# Patient Record
Sex: MALE | Race: WHITE | NOT HISPANIC OR LATINO | URBAN - METROPOLITAN AREA
[De-identification: names, ages, dates, MRNs, and addresses within clinical notes are randomized per-mention and may not be internally consistent; named-entity substitution may affect disease eponyms.]

---

## 2017-03-09 ENCOUNTER — OUTPATIENT (OUTPATIENT)
Dept: OUTPATIENT SERVICES | Facility: HOSPITAL | Age: 62
LOS: 1 days | End: 2017-03-09
Payer: COMMERCIAL

## 2017-03-09 VITALS
SYSTOLIC BLOOD PRESSURE: 120 MMHG | HEART RATE: 66 BPM | WEIGHT: 179.9 LBS | OXYGEN SATURATION: 96 % | TEMPERATURE: 98 F | RESPIRATION RATE: 18 BRPM | HEIGHT: 69 IN | DIASTOLIC BLOOD PRESSURE: 70 MMHG

## 2017-03-09 DIAGNOSIS — I20.0 UNSTABLE ANGINA: ICD-10-CM

## 2017-03-09 DIAGNOSIS — Z98.890 OTHER SPECIFIED POSTPROCEDURAL STATES: Chronic | ICD-10-CM

## 2017-03-09 DIAGNOSIS — R07.9 CHEST PAIN, UNSPECIFIED: ICD-10-CM

## 2017-03-09 DIAGNOSIS — S76.101A UNSPECIFIED INJURY OF RIGHT QUADRICEPS MUSCLE, FASCIA AND TENDON, INITIAL ENCOUNTER: Chronic | ICD-10-CM

## 2017-03-09 LAB
ALBUMIN SERPL ELPH-MCNC: 5.1 G/DL — HIGH (ref 3.3–5)
ALP SERPL-CCNC: 64 U/L — SIGNIFICANT CHANGE UP (ref 40–120)
ALT FLD-CCNC: 42 U/L RC — SIGNIFICANT CHANGE UP (ref 10–45)
ANION GAP SERPL CALC-SCNC: 15 MMOL/L — SIGNIFICANT CHANGE UP (ref 5–17)
AST SERPL-CCNC: 36 U/L — SIGNIFICANT CHANGE UP (ref 10–40)
BILIRUB SERPL-MCNC: 0.8 MG/DL — SIGNIFICANT CHANGE UP (ref 0.2–1.2)
BUN SERPL-MCNC: 20 MG/DL — SIGNIFICANT CHANGE UP (ref 7–23)
CALCIUM SERPL-MCNC: 10.1 MG/DL — SIGNIFICANT CHANGE UP (ref 8.4–10.5)
CHLORIDE SERPL-SCNC: 99 MMOL/L — SIGNIFICANT CHANGE UP (ref 96–108)
CO2 SERPL-SCNC: 26 MMOL/L — SIGNIFICANT CHANGE UP (ref 22–31)
CREAT SERPL-MCNC: 1.2 MG/DL — SIGNIFICANT CHANGE UP (ref 0.5–1.3)
GLUCOSE SERPL-MCNC: 106 MG/DL — HIGH (ref 70–99)
HCT VFR BLD CALC: 43.9 % — SIGNIFICANT CHANGE UP (ref 39–50)
HGB BLD-MCNC: 15.2 G/DL — SIGNIFICANT CHANGE UP (ref 13–17)
MCHC RBC-ENTMCNC: 30.8 PG — SIGNIFICANT CHANGE UP (ref 27–34)
MCHC RBC-ENTMCNC: 34.7 GM/DL — SIGNIFICANT CHANGE UP (ref 32–36)
MCV RBC AUTO: 88.9 FL — SIGNIFICANT CHANGE UP (ref 80–100)
PLATELET # BLD AUTO: 204 K/UL — SIGNIFICANT CHANGE UP (ref 150–400)
POTASSIUM SERPL-MCNC: 4.9 MMOL/L — SIGNIFICANT CHANGE UP (ref 3.5–5.3)
POTASSIUM SERPL-SCNC: 4.9 MMOL/L — SIGNIFICANT CHANGE UP (ref 3.5–5.3)
PROT SERPL-MCNC: 8.2 G/DL — SIGNIFICANT CHANGE UP (ref 6–8.3)
RBC # BLD: 4.94 M/UL — SIGNIFICANT CHANGE UP (ref 4.2–5.8)
RBC # FLD: 12.3 % — SIGNIFICANT CHANGE UP (ref 10.3–14.5)
SODIUM SERPL-SCNC: 140 MMOL/L — SIGNIFICANT CHANGE UP (ref 135–145)
WBC # BLD: 6.7 K/UL — SIGNIFICANT CHANGE UP (ref 3.8–10.5)
WBC # FLD AUTO: 6.7 K/UL — SIGNIFICANT CHANGE UP (ref 3.8–10.5)

## 2017-03-09 PROCEDURE — 93005 ELECTROCARDIOGRAM TRACING: CPT

## 2017-03-09 PROCEDURE — 80053 COMPREHEN METABOLIC PANEL: CPT

## 2017-03-09 PROCEDURE — 93458 L HRT ARTERY/VENTRICLE ANGIO: CPT

## 2017-03-09 PROCEDURE — C1887: CPT

## 2017-03-09 PROCEDURE — C1769: CPT

## 2017-03-09 PROCEDURE — C1894: CPT

## 2017-03-09 PROCEDURE — 93010 ELECTROCARDIOGRAM REPORT: CPT

## 2017-03-09 PROCEDURE — 85027 COMPLETE CBC AUTOMATED: CPT

## 2017-03-09 RX ORDER — ATORVASTATIN CALCIUM 80 MG/1
1 TABLET, FILM COATED ORAL
Qty: 0 | Refills: 0 | COMMUNITY

## 2017-03-09 RX ORDER — METOPROLOL TARTRATE 50 MG
1 TABLET ORAL
Qty: 0 | Refills: 0 | COMMUNITY

## 2017-03-09 RX ORDER — SODIUM CHLORIDE 9 MG/ML
3 INJECTION INTRAMUSCULAR; INTRAVENOUS; SUBCUTANEOUS EVERY 8 HOURS
Qty: 0 | Refills: 0 | Status: DISCONTINUED | OUTPATIENT
Start: 2017-03-09 | End: 2017-03-24

## 2017-03-09 RX ORDER — ASPIRIN/CALCIUM CARB/MAGNESIUM 324 MG
1 TABLET ORAL
Qty: 0 | Refills: 0 | COMMUNITY

## 2017-03-09 NOTE — H&P CARDIOLOGY - HISTORY OF PRESENT ILLNESS
59M h/o GERD, HTN and HLP presents with facial cellulitis after patient failed outpatient PO Keflex. He soon thereafter developed swelling of his nose and was started on PO keflex on Friday. He took Keflex friday (2000mg), saturday (1500mg), sunday (1500mg) and one pill today. Patient symptoms are worsening, now with fever and cellulitis tracking up into face. He says this started on Friday after he shaved his nose hairs. 62 year old male pt with PMHX of GERD, HTN and HLD presents for cardia cath. Pt reports he has been experiencing SOTO for past few weeks, evalauted by Dr. Porter Sanz, referred for cath.  Pt reports he had stress test 2 years ago that was "fine."

## 2017-03-09 NOTE — H&P CARDIOLOGY - PMH
GERD (gastroesophageal reflux disease)    High cholesterol    Hypertension GERD (gastroesophageal reflux disease)    High cholesterol    Hypertension    Rotator cuff tear, left

## 2017-03-09 NOTE — H&P CARDIOLOGY - PSH
H/O arthroscopy  right shoulder 25 years ago  History of shoulder surgery    Injury of right quadriceps muscle, fascia, or tendon  repair 25 years ago

## 2020-08-14 ENCOUNTER — APPOINTMENT (OUTPATIENT)
Dept: ORTHOPEDIC SURGERY | Facility: CLINIC | Age: 65
End: 2020-08-14
Payer: COMMERCIAL

## 2020-08-14 VITALS
SYSTOLIC BLOOD PRESSURE: 137 MMHG | HEART RATE: 88 BPM | WEIGHT: 180 LBS | TEMPERATURE: 97.7 F | HEIGHT: 69 IN | BODY MASS INDEX: 26.66 KG/M2 | DIASTOLIC BLOOD PRESSURE: 93 MMHG

## 2020-08-14 PROCEDURE — 99203 OFFICE O/P NEW LOW 30 MIN: CPT

## 2020-08-14 PROCEDURE — 73610 X-RAY EXAM OF ANKLE: CPT | Mod: RT

## 2020-08-15 ENCOUNTER — TRANSCRIPTION ENCOUNTER (OUTPATIENT)
Age: 65
End: 2020-08-15

## 2020-08-21 ENCOUNTER — RESULT REVIEW (OUTPATIENT)
Age: 65
End: 2020-08-21

## 2020-08-21 ENCOUNTER — APPOINTMENT (OUTPATIENT)
Age: 65
End: 2020-08-21
Payer: COMMERCIAL

## 2020-08-21 ENCOUNTER — OUTPATIENT (OUTPATIENT)
Dept: OUTPATIENT SERVICES | Facility: HOSPITAL | Age: 65
LOS: 1 days | End: 2020-08-21
Payer: COMMERCIAL

## 2020-08-21 DIAGNOSIS — Z00.8 ENCOUNTER FOR OTHER GENERAL EXAMINATION: ICD-10-CM

## 2020-08-21 DIAGNOSIS — Z98.890 OTHER SPECIFIED POSTPROCEDURAL STATES: Chronic | ICD-10-CM

## 2020-08-21 DIAGNOSIS — S76.101A UNSPECIFIED INJURY OF RIGHT QUADRICEPS MUSCLE, FASCIA AND TENDON, INITIAL ENCOUNTER: Chronic | ICD-10-CM

## 2020-08-21 PROCEDURE — 73721 MRI JNT OF LWR EXTRE W/O DYE: CPT

## 2020-08-21 PROCEDURE — 73721 MRI JNT OF LWR EXTRE W/O DYE: CPT | Mod: 26,RT

## 2020-08-24 NOTE — PHYSICAL EXAM
[de-identified] : Radiographs (3v R ankle) reveal PTS R foot. [de-identified] : Extremity: +Equinus R LE, extensor substitution, +PPAV R foot, able to perform R SLHR testing, residual tenderness anterolateral aspect R ankle.  Nontender R medial malleolus, Deltoid, syndesmosis, Achilles, peroneals, distal fibula, hindfoot ST, midfoot LF and PTT insertional, and forefoot / base 5th metatarsal.  Stable Drawer testing R ankle, 5 / 5 evertor strength R ankle, calves soft and nontender, sensorimotor unchanged, skin intact B LE.  AOx3, mood / affect normal.

## 2020-08-24 NOTE — DISCUSSION/SUMMARY
[de-identified] : Discussed with patient potential nature of condition, course / sequelae, options reviewed.  NB shoe wear, activity modification, ankle rehabilitation and HEP, MRI assessment ID ankle.  Return to office in 2 - 3 weeks / PRN, advanced imaging study review.  All questions answered.

## 2020-08-24 NOTE — HISTORY OF PRESENT ILLNESS
[Other: ____] : [unfilled] [FreeTextEntry1] : Reports anterolateral pain R ankle since November 2019, underwent two steroid injections by Dr. Gambao, as well as prior history of steroid injections right forefoot by RAMÓN.  Denies antecedent trauma nor additional musculoskeletal complaints referable to foot / ankle.  Had MRI at outside facility [facility] N/A for this review.

## 2020-08-31 ENCOUNTER — APPOINTMENT (OUTPATIENT)
Dept: ORTHOPEDIC SURGERY | Facility: CLINIC | Age: 65
End: 2020-08-31
Payer: COMMERCIAL

## 2020-08-31 VITALS
TEMPERATURE: 97.9 F | DIASTOLIC BLOOD PRESSURE: 80 MMHG | BODY MASS INDEX: 26.66 KG/M2 | HEIGHT: 69 IN | HEART RATE: 88 BPM | WEIGHT: 180 LBS | SYSTOLIC BLOOD PRESSURE: 150 MMHG

## 2020-08-31 PROCEDURE — 99213 OFFICE O/P EST LOW 20 MIN: CPT

## 2020-10-12 ENCOUNTER — APPOINTMENT (OUTPATIENT)
Dept: ORTHOPEDIC SURGERY | Facility: CLINIC | Age: 65
End: 2020-10-12
Payer: COMMERCIAL

## 2020-10-12 VITALS — TEMPERATURE: 97.2 F

## 2020-10-12 PROCEDURE — 99213 OFFICE O/P EST LOW 20 MIN: CPT

## 2020-11-24 ENCOUNTER — APPOINTMENT (OUTPATIENT)
Dept: ORTHOPEDIC SURGERY | Facility: CLINIC | Age: 65
End: 2020-11-24
Payer: COMMERCIAL

## 2020-11-24 VITALS
HEART RATE: 99 BPM | DIASTOLIC BLOOD PRESSURE: 83 MMHG | SYSTOLIC BLOOD PRESSURE: 125 MMHG | WEIGHT: 180 LBS | OXYGEN SATURATION: 96 % | HEIGHT: 69 IN | BODY MASS INDEX: 26.66 KG/M2

## 2020-11-24 PROCEDURE — 99213 OFFICE O/P EST LOW 20 MIN: CPT

## 2020-11-24 RX ORDER — DICLOFENAC SODIUM 1% 10 MG/G
1 GEL TOPICAL
Qty: 1 | Refills: 1 | Status: ACTIVE | COMMUNITY
Start: 2020-11-24 | End: 1900-01-01

## 2021-01-19 ENCOUNTER — APPOINTMENT (OUTPATIENT)
Dept: ORTHOPEDIC SURGERY | Facility: CLINIC | Age: 66
End: 2021-01-19
Payer: COMMERCIAL

## 2021-01-19 VITALS — TEMPERATURE: 96.7 F

## 2021-01-19 PROCEDURE — 99213 OFFICE O/P EST LOW 20 MIN: CPT

## 2021-01-19 PROCEDURE — 99072 ADDL SUPL MATRL&STAF TM PHE: CPT

## 2021-01-28 ENCOUNTER — APPOINTMENT (OUTPATIENT)
Dept: MRI IMAGING | Facility: CLINIC | Age: 66
End: 2021-01-28
Payer: COMMERCIAL

## 2021-01-28 ENCOUNTER — RESULT REVIEW (OUTPATIENT)
Age: 66
End: 2021-01-28

## 2021-01-28 ENCOUNTER — OUTPATIENT (OUTPATIENT)
Dept: OUTPATIENT SERVICES | Facility: HOSPITAL | Age: 66
LOS: 1 days | End: 2021-01-28
Payer: COMMERCIAL

## 2021-01-28 DIAGNOSIS — Z98.890 OTHER SPECIFIED POSTPROCEDURAL STATES: Chronic | ICD-10-CM

## 2021-01-28 DIAGNOSIS — S76.101A UNSPECIFIED INJURY OF RIGHT QUADRICEPS MUSCLE, FASCIA AND TENDON, INITIAL ENCOUNTER: Chronic | ICD-10-CM

## 2021-01-28 DIAGNOSIS — M24.9 JOINT DERANGEMENT, UNSPECIFIED: ICD-10-CM

## 2021-01-28 PROCEDURE — 73721 MRI JNT OF LWR EXTRE W/O DYE: CPT | Mod: 26,RT

## 2021-01-28 PROCEDURE — 73721 MRI JNT OF LWR EXTRE W/O DYE: CPT

## 2021-02-08 ENCOUNTER — APPOINTMENT (OUTPATIENT)
Dept: ORTHOPEDIC SURGERY | Facility: CLINIC | Age: 66
End: 2021-02-08
Payer: COMMERCIAL

## 2021-02-08 VITALS — TEMPERATURE: 97.2 F

## 2021-02-08 DIAGNOSIS — M84.30XA STRESS FRACTURE, UNSPECIFIED SITE, INITIAL ENCOUNTER FOR FRACTURE: ICD-10-CM

## 2021-02-08 PROCEDURE — 99213 OFFICE O/P EST LOW 20 MIN: CPT

## 2021-02-08 PROCEDURE — 99072 ADDL SUPL MATRL&STAF TM PHE: CPT

## 2021-02-16 PROBLEM — M84.30XA STRESS REACTION OF BONE: Status: ACTIVE | Noted: 2020-08-31

## 2021-03-01 ENCOUNTER — APPOINTMENT (OUTPATIENT)
Dept: ORTHOPEDIC SURGERY | Facility: CLINIC | Age: 66
End: 2021-03-01
Payer: COMMERCIAL

## 2021-03-01 VITALS
BODY MASS INDEX: 26.66 KG/M2 | SYSTOLIC BLOOD PRESSURE: 141 MMHG | HEIGHT: 69 IN | HEART RATE: 109 BPM | DIASTOLIC BLOOD PRESSURE: 91 MMHG | WEIGHT: 180 LBS

## 2021-03-01 PROCEDURE — 20550 NJX 1 TENDON SHEATH/LIGAMENT: CPT | Mod: F8

## 2021-03-01 PROCEDURE — 99072 ADDL SUPL MATRL&STAF TM PHE: CPT

## 2021-03-01 PROCEDURE — 99215 OFFICE O/P EST HI 40 MIN: CPT | Mod: 25

## 2021-03-01 PROCEDURE — 73130 X-RAY EXAM OF HAND: CPT | Mod: RT

## 2021-03-16 ENCOUNTER — APPOINTMENT (OUTPATIENT)
Dept: ORTHOPEDIC SURGERY | Facility: CLINIC | Age: 66
End: 2021-03-16

## 2021-03-19 ENCOUNTER — APPOINTMENT (OUTPATIENT)
Dept: ORTHOPEDIC SURGERY | Facility: CLINIC | Age: 66
End: 2021-03-19
Payer: COMMERCIAL

## 2021-03-19 VITALS — BODY MASS INDEX: 26.66 KG/M2 | WEIGHT: 180 LBS | HEIGHT: 69 IN

## 2021-03-19 PROCEDURE — 99214 OFFICE O/P EST MOD 30 MIN: CPT | Mod: 25

## 2021-03-19 PROCEDURE — 73080 X-RAY EXAM OF ELBOW: CPT | Mod: RT

## 2021-03-19 PROCEDURE — 99072 ADDL SUPL MATRL&STAF TM PHE: CPT

## 2021-03-19 PROCEDURE — 20550 NJX 1 TENDON SHEATH/LIGAMENT: CPT | Mod: F7

## 2021-04-09 ENCOUNTER — APPOINTMENT (OUTPATIENT)
Dept: ORTHOPEDIC SURGERY | Facility: CLINIC | Age: 66
End: 2021-04-09
Payer: COMMERCIAL

## 2021-04-09 VITALS — WEIGHT: 180 LBS | BODY MASS INDEX: 26.66 KG/M2 | HEIGHT: 69 IN

## 2021-04-09 DIAGNOSIS — M77.11 LATERAL EPICONDYLITIS, RIGHT ELBOW: ICD-10-CM

## 2021-04-09 PROCEDURE — 99072 ADDL SUPL MATRL&STAF TM PHE: CPT

## 2021-04-09 PROCEDURE — 99214 OFFICE O/P EST MOD 30 MIN: CPT

## 2021-04-09 RX ORDER — NAPROXEN 500 MG/1
500 TABLET ORAL
Qty: 60 | Refills: 1 | Status: ACTIVE | COMMUNITY
Start: 2021-04-09 | End: 1900-01-01

## 2021-05-11 ENCOUNTER — APPOINTMENT (OUTPATIENT)
Dept: ORTHOPEDIC SURGERY | Facility: CLINIC | Age: 66
End: 2021-05-11
Payer: COMMERCIAL

## 2021-05-11 VITALS
BODY MASS INDEX: 26.66 KG/M2 | WEIGHT: 180 LBS | HEART RATE: 78 BPM | SYSTOLIC BLOOD PRESSURE: 110 MMHG | DIASTOLIC BLOOD PRESSURE: 72 MMHG | HEIGHT: 69 IN

## 2021-05-11 PROCEDURE — 99072 ADDL SUPL MATRL&STAF TM PHE: CPT

## 2021-05-11 PROCEDURE — 99213 OFFICE O/P EST LOW 20 MIN: CPT

## 2021-05-11 NOTE — DISCUSSION/SUMMARY
[de-identified] : Options reviewed, NB shoe wear, activity progression protocol, ankle / foot rehabilitation and HEP.  Return to office in 4 - 6 months / PRN, all questions answered.

## 2021-05-11 NOTE — HISTORY OF PRESENT ILLNESS
[Sneakers] : meron [FreeTextEntry1] : 66 y/o male returns for f/u R anterior ankle pain since November 2019.He is returned to gym 5 weeks ago and has been using elliptical and reports slight pain occasionally.  Pt reports doing well since last visit.Rates his pin 1/10 and is not taking pain medication.  Pt denies any numbness/tingling on R toes. Pt denies any new symptoms/injuries to R foot/ankle. Denies antecedent trauma nor additional musculoskeletal complaints referable to foot / ankle.

## 2021-05-11 NOTE — PHYSICAL EXAM
[Normal] : Oriented to person, place, and time, insight and judgement were intact and the affect was normal [de-identified] : Extremity: +Equinus R LE, +extensor substitution, +PPAV R foot, able to perform R SLHR testing, nontender anterior aspect right ankle / talus, essentially resolved soft tissue swelling anterior R ankle / hindfoot.  Nontender R medial malleolus, Deltoid, syndesmosis, Achilles, peroneals insertional, distal fibula, hindfoot ST, midfoot LF and PTT insertional, and forefoot / base 5th metatarsal.  Stable Drawer testing R ankle, 5 / 5 evertor and invertor strength R ankle, calves soft and nontender, sensorimotor unchanged, skin intact B LE.  AOx3, mood / affect normal. [de-identified] : DANG, NAD

## 2021-10-31 ENCOUNTER — NON-APPOINTMENT (OUTPATIENT)
Age: 66
End: 2021-10-31

## 2021-11-03 ENCOUNTER — APPOINTMENT (OUTPATIENT)
Dept: ORTHOPEDIC SURGERY | Facility: CLINIC | Age: 66
End: 2021-11-03
Payer: COMMERCIAL

## 2021-11-03 PROCEDURE — 99214 OFFICE O/P EST MOD 30 MIN: CPT | Mod: 25

## 2021-11-12 ENCOUNTER — APPOINTMENT (OUTPATIENT)
Dept: ORTHOPEDIC SURGERY | Facility: CLINIC | Age: 66
End: 2021-11-12
Payer: COMMERCIAL

## 2021-11-12 VITALS
HEART RATE: 74 BPM | SYSTOLIC BLOOD PRESSURE: 120 MMHG | BODY MASS INDEX: 27.4 KG/M2 | WEIGHT: 185 LBS | HEIGHT: 69 IN | DIASTOLIC BLOOD PRESSURE: 82 MMHG

## 2021-11-12 PROCEDURE — 26055 INCISE FINGER TENDON SHEATH: CPT | Mod: F7

## 2021-11-15 NOTE — PROCEDURE
[de-identified] : Consent obtained. Local anesthesia of 1% lidocaine with epinephrine and bicarbonate was administered to the right middle and ring finger.  Extremity was sterilely prepped and draped.  Timeout was performed identifying correct procedure and correct site.  Patient participated and agreed.  One and half centimeter longitudinal incision was made over the right middle and ring finger.  Sharp dissection was used for skin.  Blunt dissection used in the subcutaneous tissues.  Ragnell retractors were used to protect the radial and ulnar neurovascular bundles.  The A1 pulley was divided in his midline using a 15 blade.  Any remaining palmar pulleys as well as a small percentage of the A2 pulley, less than 10%, were released with Littler scissors.  Traction tenolysis of the flexor digitorum profundus and flexor digitorum superficialis was performed.  Any adhesions between the two tendons were carefully incised.  The patient was able to flex and extend his digits with no further triggering.  Wounds were irrigated.  Hemostasis achieved.  Skin was closed with 4-0 nylon.  Sterile dressings were applied.  Patient tolerated the procedure without any complications.

## 2021-11-23 ENCOUNTER — APPOINTMENT (OUTPATIENT)
Dept: ORTHOPEDIC SURGERY | Facility: CLINIC | Age: 66
End: 2021-11-23
Payer: COMMERCIAL

## 2021-11-23 PROCEDURE — 99213 OFFICE O/P EST LOW 20 MIN: CPT

## 2021-11-24 ENCOUNTER — APPOINTMENT (OUTPATIENT)
Dept: ORTHOPEDIC SURGERY | Facility: CLINIC | Age: 66
End: 2021-11-24
Payer: COMMERCIAL

## 2021-11-24 PROCEDURE — 99024 POSTOP FOLLOW-UP VISIT: CPT

## 2022-01-14 ENCOUNTER — APPOINTMENT (OUTPATIENT)
Dept: ORTHOPEDIC SURGERY | Facility: CLINIC | Age: 67
End: 2022-01-14

## 2022-05-10 ENCOUNTER — APPOINTMENT (OUTPATIENT)
Dept: ORTHOPEDIC SURGERY | Facility: CLINIC | Age: 67
End: 2022-05-10
Payer: MEDICARE

## 2022-05-10 ENCOUNTER — NON-APPOINTMENT (OUTPATIENT)
Age: 67
End: 2022-05-10

## 2022-05-10 VITALS — BODY MASS INDEX: 27.4 KG/M2 | WEIGHT: 185 LBS | HEIGHT: 69 IN

## 2022-05-10 PROCEDURE — 99214 OFFICE O/P EST MOD 30 MIN: CPT

## 2022-05-10 PROCEDURE — 73610 X-RAY EXAM OF ANKLE: CPT | Mod: RT

## 2022-07-14 ENCOUNTER — NON-APPOINTMENT (OUTPATIENT)
Age: 67
End: 2022-07-14

## 2022-08-02 ENCOUNTER — APPOINTMENT (OUTPATIENT)
Dept: ORTHOPEDIC SURGERY | Facility: CLINIC | Age: 67
End: 2022-08-02

## 2022-08-02 VITALS — BODY MASS INDEX: 27.4 KG/M2 | WEIGHT: 185 LBS | HEIGHT: 69 IN

## 2022-08-02 PROCEDURE — 99213 OFFICE O/P EST LOW 20 MIN: CPT

## 2022-10-17 ENCOUNTER — NON-APPOINTMENT (OUTPATIENT)
Age: 67
End: 2022-10-17

## 2022-11-02 ENCOUNTER — APPOINTMENT (OUTPATIENT)
Dept: ORTHOPEDIC SURGERY | Facility: CLINIC | Age: 67
End: 2022-11-02

## 2022-11-02 DIAGNOSIS — M65.332 TRIGGER FINGER, LEFT MIDDLE FINGER: ICD-10-CM

## 2022-11-02 PROCEDURE — 20550 NJX 1 TENDON SHEATH/LIGAMENT: CPT | Mod: F3

## 2022-11-02 PROCEDURE — 73130 X-RAY EXAM OF HAND: CPT | Mod: LT

## 2022-11-02 PROCEDURE — 99214 OFFICE O/P EST MOD 30 MIN: CPT | Mod: 25

## 2022-11-18 ENCOUNTER — APPOINTMENT (OUTPATIENT)
Dept: ORTHOPEDIC SURGERY | Facility: CLINIC | Age: 67
End: 2022-11-18

## 2022-11-18 DIAGNOSIS — M65.342 TRIGGER FINGER, LEFT RING FINGER: ICD-10-CM

## 2022-11-18 DIAGNOSIS — M65.331 TRIGGER FINGER, RIGHT MIDDLE FINGER: ICD-10-CM

## 2022-11-18 DIAGNOSIS — M65.341 TRIGGER FINGER, RIGHT RING FINGER: ICD-10-CM

## 2022-11-18 PROCEDURE — 99214 OFFICE O/P EST MOD 30 MIN: CPT | Mod: 25

## 2022-11-18 PROCEDURE — 20550 NJX 1 TENDON SHEATH/LIGAMENT: CPT | Mod: F2

## 2022-11-21 PROBLEM — M65.331 TRIGGER MIDDLE FINGER OF RIGHT HAND: Status: ACTIVE | Noted: 2021-03-01

## 2022-12-07 ENCOUNTER — OFFICE (OUTPATIENT)
Dept: URBAN - METROPOLITAN AREA CLINIC 90 | Facility: CLINIC | Age: 67
Setting detail: OPHTHALMOLOGY
End: 2022-12-07
Payer: MEDICARE

## 2022-12-07 DIAGNOSIS — D31.32: ICD-10-CM

## 2022-12-07 DIAGNOSIS — H34.8322: ICD-10-CM

## 2022-12-07 DIAGNOSIS — H35.412: ICD-10-CM

## 2022-12-07 DIAGNOSIS — H25.13: ICD-10-CM

## 2022-12-07 DIAGNOSIS — H35.373: ICD-10-CM

## 2022-12-07 PROCEDURE — 92134 CPTRZ OPH DX IMG PST SGM RTA: CPT | Performed by: OPHTHALMOLOGY

## 2022-12-07 PROCEDURE — 92014 COMPRE OPH EXAM EST PT 1/>: CPT | Performed by: OPHTHALMOLOGY

## 2022-12-07 ASSESSMENT — REFRACTION_CURRENTRX
OD_AXIS: 102
OD_SPHERE: +1.75
OS_ADD: +2.50
OD_VPRISM_DIRECTION: PROGS
OS_VPRISM_DIRECTION: PROGS
OS_AXIS: 094
OS_ADD: +2.75
OS_CYLINDER: -0.75
OS_OVR_VA: 20/
OD_ADD: +2.50
OD_OVR_VA: 20/
OD_CYLINDER: -0.75
OS_AXIS: 091
OS_OVR_VA: 20/
OS_VPRISM_DIRECTION: PROGS
OD_VPRISM_DIRECTION: PROGS
OS_SPHERE: +1.75
OD_OVR_VA: 20/
OD_AXIS: 107
OS_SPHERE: +1.75
OD_ADD: +2.75
OS_CYLINDER: -0.75
OD_SPHERE: +1.75
OD_CYLINDER: -0.75

## 2022-12-07 ASSESSMENT — REFRACTION_MANIFEST
OD_VA1: 20/20-1
OS_CYLINDER: -0.75
OD_ADD: +2.50
OS_ADD: +2.50
OD_ADD: +2.50
OD_VA1: 20/20
OS_AXIS: 090
OS_VA1: 20/20
OS_VA1: 20/30+2
OD_SPHERE: +1.75
OD_SPHERE: +2.00
OS_SPHERE: +1.75
OS_SPHERE: +2.25
OS_CYLINDER: -0.75
OD_VA2: 20/20
OD_AXIS: 100
OD_VA1: 20/20
OD_VA2: 20/20
OS_ADD: +2.50
OS_ADD: +2.50
OS_AXIS: 90
OD_SPHERE: +2.00
OD_AXIS: 105
OD_ADD: +2.50
OS_VA1: 20/25+2
OD_CYLINDER: -0.50
OS_VA2: 20/20
OS_VA2: 20/20
OS_AXIS: 090
OD_CYLINDER: -0.75
OS_CYLINDER: -0.50
OD_AXIS: 105
OS_SPHERE: +2.00
OD_CYLINDER: -0.75

## 2022-12-07 ASSESSMENT — SPHEQUIV_DERIVED
OD_SPHEQUIV: 1.375
OS_SPHEQUIV: 1.5
OS_SPHEQUIV: 2.25
OD_SPHEQUIV: 1.75
OD_SPHEQUIV: 2.625
OD_SPHEQUIV: 1.625
OS_SPHEQUIV: 1.625
OS_SPHEQUIV: 1.875

## 2022-12-07 ASSESSMENT — KERATOMETRY
OS_AXISANGLE_DEGREES: 169
OS_K1POWER_DIOPTERS: 45.00
OD_K2POWER_DIOPTERS: 45.50
OD_AXISANGLE_DEGREES: 006
METHOD_AUTO_MANUAL: AUTO
OS_K2POWER_DIOPTERS: 45.50
OD_K1POWER_DIOPTERS: 44.75

## 2022-12-07 ASSESSMENT — REFRACTION_AUTOREFRACTION
OS_SPHERE: +2.75
OS_CYLINDER: -1.00
OD_SPHERE: +3.25
OD_AXIS: 089
OD_CYLINDER: -1.25
OS_AXIS: 091

## 2022-12-07 ASSESSMENT — TONOMETRY
OD_IOP_MMHG: 13
OS_IOP_MMHG: 14

## 2022-12-07 ASSESSMENT — VISUAL ACUITY
OD_BCVA: 20/30-2
OS_BCVA: 20/25-2

## 2022-12-07 ASSESSMENT — AXIALLENGTH_DERIVED
OD_AL: 22.5112
OD_AL: 22.4229
OS_AL: 22.1644
OD_AL: 22.379
OS_AL: 22.4254
OS_AL: 22.2941
OD_AL: 22.0764
OS_AL: 22.3815

## 2022-12-07 ASSESSMENT — CONFRONTATIONAL VISUAL FIELD TEST (CVF)
OS_FINDINGS: FULL
OD_FINDINGS: FULL

## 2023-02-13 ENCOUNTER — OFFICE (OUTPATIENT)
Dept: URBAN - METROPOLITAN AREA CLINIC 90 | Facility: CLINIC | Age: 68
Setting detail: OPHTHALMOLOGY
End: 2023-02-13
Payer: MEDICARE

## 2023-02-13 DIAGNOSIS — H57.12: ICD-10-CM

## 2023-02-13 DIAGNOSIS — E78.01: ICD-10-CM

## 2023-02-13 PROCEDURE — 92012 INTRM OPH EXAM EST PATIENT: CPT | Performed by: OPHTHALMOLOGY

## 2023-02-13 ASSESSMENT — REFRACTION_MANIFEST
OD_AXIS: 100
OD_VA2: 20/20
OD_ADD: +2.50
OD_VA2: 20/20
OS_VA1: 20/20
OS_CYLINDER: -0.50
OD_SPHERE: +2.00
OS_SPHERE: +1.75
OD_AXIS: 105
OD_CYLINDER: -0.75
OS_VA1: 20/25+2
OS_VA2: 20/20
OD_ADD: +2.50
OD_SPHERE: +2.00
OD_ADD: +2.50
OS_VA1: 20/30+2
OD_VA1: 20/20-1
OD_CYLINDER: -0.50
OS_AXIS: 090
OS_AXIS: 090
OS_ADD: +2.50
OS_ADD: +2.50
OS_CYLINDER: -0.75
OD_AXIS: 105
OD_CYLINDER: -0.75
OS_CYLINDER: -0.75
OD_VA1: 20/20
OS_AXIS: 90
OD_VA1: 20/20
OS_SPHERE: +2.25
OD_SPHERE: +1.75
OS_SPHERE: +2.00
OS_ADD: +2.50
OS_VA2: 20/20

## 2023-02-13 ASSESSMENT — REFRACTION_CURRENTRX
OS_SPHERE: +1.75
OS_ADD: +2.50
OD_VPRISM_DIRECTION: PROGS
OS_AXIS: 091
OS_SPHERE: +1.75
OD_CYLINDER: +0.75
OS_ADD: +2.75
OS_OVR_VA: 20/
OS_VPRISM_DIRECTION: PROGS
OS_AXIS: 094
OD_CYLINDER: -0.75
OS_VPRISM_DIRECTION: PROGS
OD_ADD: +2.50
OD_SPHERE: +1.75
OD_SPHERE: +1.75
OS_CYLINDER: -0.75
OS_SPHERE: +1.75
OS_VPRISM_DIRECTION: PROGS
OD_ADD: +2.75
OD_VPRISM_DIRECTION: PROGS
OD_CYLINDER: -0.75
OS_OVR_VA: 20/
OS_CYLINDER: -0.75
OD_AXIS: 097
OS_ADD: +2.50
OD_AXIS: 102
OS_AXIS: 091
OD_OVR_VA: 20/
OD_SPHERE: +1.75
OD_OVR_VA: 20/
OS_OVR_VA: 20/
OD_OVR_VA: 20/
OS_CYLINDER: -0.75
OD_ADD: +2.50
OD_AXIS: 107
OD_VPRISM_DIRECTION: PROGS

## 2023-02-13 ASSESSMENT — SPHEQUIV_DERIVED
OD_SPHEQUIV: 1.75
OD_SPHEQUIV: 2.375
OS_SPHEQUIV: 1.875
OD_SPHEQUIV: 1.375
OS_SPHEQUIV: 1.625
OD_SPHEQUIV: 1.625
OS_SPHEQUIV: 1.5
OS_SPHEQUIV: 2.25

## 2023-02-13 ASSESSMENT — AXIALLENGTH_DERIVED
OD_AL: 22.3041
OS_AL: 22.2607
OS_AL: 22.0035
OD_AL: 22.1743
OD_AL: 21.9612
OS_AL: 22.2174
OD_AL: 22.2174
OS_AL: 22.1313

## 2023-02-13 ASSESSMENT — KERATOMETRY
OD_K1POWER_DIOPTERS: 45.25
OS_K2POWER_DIOPTERS: 45.75
METHOD_AUTO_MANUAL: AUTO
OS_AXISANGLE_DEGREES: 090
OS_K1POWER_DIOPTERS: 45.75
OD_AXISANGLE_DEGREES: 007
OD_K2POWER_DIOPTERS: 46.25

## 2023-02-13 ASSESSMENT — VISUAL ACUITY
OS_BCVA: 20/25-2
OD_BCVA: 20/30+2

## 2023-02-13 ASSESSMENT — REFRACTION_AUTOREFRACTION
OD_SPHERE: +3.00
OD_CYLINDER: -1.25
OD_AXIS: 087
OS_CYLINDER: -1.00
OS_SPHERE: +2.75
OS_AXIS: 096

## 2023-02-13 ASSESSMENT — TONOMETRY
OS_IOP_MMHG: 18
OD_IOP_MMHG: 18

## 2023-02-13 ASSESSMENT — CONFRONTATIONAL VISUAL FIELD TEST (CVF)
OS_FINDINGS: FULL
OD_FINDINGS: FULL

## 2023-12-04 ENCOUNTER — OFFICE (OUTPATIENT)
Dept: URBAN - METROPOLITAN AREA CLINIC 90 | Facility: CLINIC | Age: 68
Setting detail: OPHTHALMOLOGY
End: 2023-12-04
Payer: MEDICARE

## 2023-12-04 DIAGNOSIS — H35.373: ICD-10-CM

## 2023-12-04 DIAGNOSIS — H25.13: ICD-10-CM

## 2023-12-04 DIAGNOSIS — H57.12: ICD-10-CM

## 2023-12-04 DIAGNOSIS — H35.412: ICD-10-CM

## 2023-12-04 DIAGNOSIS — D31.32: ICD-10-CM

## 2023-12-04 DIAGNOSIS — H34.8322: ICD-10-CM

## 2023-12-04 DIAGNOSIS — E78.01: ICD-10-CM

## 2023-12-04 PROCEDURE — 92014 COMPRE OPH EXAM EST PT 1/>: CPT | Performed by: OPHTHALMOLOGY

## 2023-12-04 PROCEDURE — 92250 FUNDUS PHOTOGRAPHY W/I&R: CPT | Performed by: OPHTHALMOLOGY

## 2023-12-04 ASSESSMENT — REFRACTION_CURRENTRX
OD_AXIS: 104
OD_CYLINDER: +0.75
OD_AXIS: 097
OS_SPHERE: +1.75
OD_OVR_VA: 20/
OS_CYLINDER: -0.75
OS_CYLINDER: -0.75
OS_OVR_VA: 20/
OS_VPRISM_DIRECTION: PROGS
OS_ADD: +2.75
OS_SPHERE: +1.75
OD_ADD: +2.75
OS_ADD: +2.50
OS_AXIS: 094
OD_VPRISM_DIRECTION: PROGS
OD_VPRISM_DIRECTION: PROGS
OD_CYLINDER: -0.75
OS_OVR_VA: 20/
OS_SPHERE: +1.75
OS_CYLINDER: -0.75
OS_AXIS: 091
OD_SPHERE: +1.75
OD_AXIS: 102
OD_SPHERE: +1.75
OD_OVR_VA: 20/
OD_AXIS: 107
OD_SPHERE: +1.75
OD_VPRISM_DIRECTION: PROGS
OD_ADD: +2.50
OS_AXIS: 091
OS_CYLINDER: -0.75
OD_VPRISM_DIRECTION: PROGS
OS_SPHERE: +1.75
OD_CYLINDER: -0.75
OS_VPRISM_DIRECTION: PROGS
OD_ADD: +2.50
OD_CYLINDER: -0.75
OS_ADD: +2.50
OS_AXIS: 092
OS_ADD: +2.00
OD_OVR_VA: 20/
OD_SPHERE: +1.75
OS_OVR_VA: 20/
OD_ADD: +2.00

## 2023-12-04 ASSESSMENT — REFRACTION_MANIFEST
OD_VA1: 20/20-1
OD_CYLINDER: -0.75
OS_AXIS: 090
OD_ADD: +2.50
OS_VA2: 20/20
OS_AXIS: 90
OS_ADD: +2.50
OS_SPHERE: +2.25
OS_VA2: 20/20
OS_CYLINDER: -0.75
OD_ADD: +2.50
OS_CYLINDER: -0.75
OS_VA1: 20/20
OD_VA1: 20/20
OS_VA1: 20/25+2
OS_SPHERE: +2.00
OD_VA2: 20/20
OS_ADD: +2.50
OD_ADD: +2.50
OS_VA1: 20/30+2
OD_CYLINDER: -0.50
OD_AXIS: 105
OD_CYLINDER: -0.75
OD_SPHERE: +1.75
OD_AXIS: 100
OS_AXIS: 090
OD_SPHERE: +2.00
OS_CYLINDER: -0.50
OS_SPHERE: +1.75
OD_VA1: 20/20
OD_SPHERE: +2.00
OD_VA2: 20/20
OD_AXIS: 105
OS_ADD: +2.50

## 2023-12-04 ASSESSMENT — SPHEQUIV_DERIVED
OD_SPHEQUIV: 3
OD_SPHEQUIV: 1.75
OD_SPHEQUIV: 1.625
OS_SPHEQUIV: 1.625
OD_SPHEQUIV: 1.375
OS_SPHEQUIV: 2.875
OS_SPHEQUIV: 1.875
OS_SPHEQUIV: 1.5

## 2023-12-04 ASSESSMENT — REFRACTION_AUTOREFRACTION
OS_CYLINDER: -1.25
OS_AXIS: 090
OD_SPHERE: +3.75
OD_AXIS: 090
OS_SPHERE: +3.50
OD_CYLINDER: -1.50

## 2023-12-04 ASSESSMENT — CONFRONTATIONAL VISUAL FIELD TEST (CVF)
OS_FINDINGS: FULL
OD_FINDINGS: FULL

## 2024-01-08 ENCOUNTER — NON-APPOINTMENT (OUTPATIENT)
Age: 69
End: 2024-01-08

## 2024-01-12 ENCOUNTER — APPOINTMENT (OUTPATIENT)
Dept: ORTHOPEDIC SURGERY | Facility: CLINIC | Age: 69
End: 2024-01-12
Payer: MEDICARE

## 2024-01-12 VITALS
HEIGHT: 69 IN | HEART RATE: 97 BPM | WEIGHT: 180 LBS | DIASTOLIC BLOOD PRESSURE: 82 MMHG | SYSTOLIC BLOOD PRESSURE: 128 MMHG | BODY MASS INDEX: 26.66 KG/M2

## 2024-01-12 PROCEDURE — 99214 OFFICE O/P EST MOD 30 MIN: CPT

## 2024-01-12 PROCEDURE — 72170 X-RAY EXAM OF PELVIS: CPT

## 2024-01-12 RX ORDER — MELOXICAM 15 MG/1
15 TABLET ORAL DAILY
Qty: 1 | Refills: 0 | Status: ACTIVE | COMMUNITY
Start: 2024-01-12 | End: 1900-01-01

## 2024-02-09 ENCOUNTER — APPOINTMENT (OUTPATIENT)
Dept: ORTHOPEDIC SURGERY | Facility: CLINIC | Age: 69
End: 2024-02-09
Payer: MEDICARE

## 2024-02-09 VITALS — WEIGHT: 183 LBS | BODY MASS INDEX: 27.11 KG/M2 | HEIGHT: 69 IN

## 2024-02-09 PROCEDURE — 20610 DRAIN/INJ JOINT/BURSA W/O US: CPT | Mod: RT

## 2024-02-09 PROCEDURE — 99214 OFFICE O/P EST MOD 30 MIN: CPT | Mod: 25

## 2024-02-22 ENCOUNTER — APPOINTMENT (OUTPATIENT)
Dept: ORTHOPEDIC SURGERY | Facility: CLINIC | Age: 69
End: 2024-02-22
Payer: MEDICARE

## 2024-02-22 VITALS — WEIGHT: 180 LBS | BODY MASS INDEX: 26.66 KG/M2 | HEIGHT: 69 IN

## 2024-02-22 PROCEDURE — 99214 OFFICE O/P EST MOD 30 MIN: CPT

## 2024-02-22 RX ORDER — DIAZEPAM 2 MG/1
2 TABLET ORAL ONCE
Qty: 2 | Refills: 0 | Status: ACTIVE | COMMUNITY
Start: 2024-02-22 | End: 1900-01-01

## 2024-02-22 RX ORDER — DICLOFENAC SODIUM 75 MG/1
75 TABLET, DELAYED RELEASE ORAL
Qty: 28 | Refills: 0 | Status: ACTIVE | COMMUNITY
Start: 2024-02-22 | End: 1900-01-01

## 2024-02-23 ENCOUNTER — APPOINTMENT (OUTPATIENT)
Dept: ORTHOPEDIC SURGERY | Facility: CLINIC | Age: 69
End: 2024-02-23

## 2024-02-26 ENCOUNTER — APPOINTMENT (OUTPATIENT)
Dept: ORTHOPEDIC SURGERY | Facility: CLINIC | Age: 69
End: 2024-02-26
Payer: MEDICARE

## 2024-02-26 VITALS
TEMPERATURE: 97.2 F | DIASTOLIC BLOOD PRESSURE: 93 MMHG | OXYGEN SATURATION: 98 % | WEIGHT: 185 LBS | HEIGHT: 69 IN | RESPIRATION RATE: 17 BRPM | BODY MASS INDEX: 27.4 KG/M2 | SYSTOLIC BLOOD PRESSURE: 166 MMHG | HEART RATE: 79 BPM

## 2024-02-26 DIAGNOSIS — M25.571 PAIN IN RIGHT ANKLE AND JOINTS OF RIGHT FOOT: ICD-10-CM

## 2024-02-26 DIAGNOSIS — R93.7 ABNORMAL FINDINGS ON DIAGNOSTIC IMAGING OF OTHER PARTS OF MUSCULOSKELETAL SYSTEM: ICD-10-CM

## 2024-02-26 DIAGNOSIS — M62.89 OTHER SPECIFIED DISORDERS OF MUSCLE: ICD-10-CM

## 2024-02-26 PROCEDURE — 99214 OFFICE O/P EST MOD 30 MIN: CPT

## 2024-02-26 PROCEDURE — 73610 X-RAY EXAM OF ANKLE: CPT | Mod: RT

## 2024-02-26 NOTE — HISTORY OF PRESENT ILLNESS
[Sneakers] : meron [FreeTextEntry1] : 70 y/o male returns for f/u R ankle pain since November 2019, R ankle pain restarted x 3 weeks. He denies any specific trauma or injury. Pt reports he was doing well, however he states he recently experienced R hip bursitis and believes his R ankle overcompensated. He states he has been placing a lot of pressure on his R ankle. He localizes the pain to anterior, lateral and medial aspects of R ankle. Walking worsens the pain. Rest helps a little. Pt denies any numbness/tingling/limping. Denies additional musculoskeletal complaints referable to foot / ankle. He is currently using a walker to ambulate.

## 2024-02-26 NOTE — PHYSICAL EXAM
[de-identified] : Extremity: +equinus (releases) R LE, extensor substitution with residual tenderness dorsal extensors R LE, +PPAV R foot.  Nontender R distal fibula, peroneals, syndesmosis, Achilles, medial malleolus, hindfoot ST, midfoot LF and PTT insertional, and forefoot.  Calves soft and nontender, sensorimotor unchanged, skin intact R LE.  AOx3, mood / affect normal. [de-identified] : Radiographs (3v R ankle) reveal PTS R foot.

## 2024-02-26 NOTE — DISCUSSION/SUMMARY
[de-identified] : Options reviewed, NB shoe wear, activity modification (low impact), calf stretching exercises and HEP, Voltaren gel, MRI assessment ID ankle (by request).  Return to office in 3 - 4 weeks / PRN, advanced imaging study review TC.  All questions answreed.

## 2024-02-29 ENCOUNTER — APPOINTMENT (OUTPATIENT)
Dept: MRI IMAGING | Facility: CLINIC | Age: 69
End: 2024-02-29
Payer: MEDICARE

## 2024-02-29 ENCOUNTER — OUTPATIENT (OUTPATIENT)
Dept: OUTPATIENT SERVICES | Facility: HOSPITAL | Age: 69
LOS: 1 days | End: 2024-02-29
Payer: MEDICARE

## 2024-02-29 DIAGNOSIS — Z98.890 OTHER SPECIFIED POSTPROCEDURAL STATES: Chronic | ICD-10-CM

## 2024-02-29 DIAGNOSIS — M24.171 OTHER ARTICULAR CARTILAGE DISORDERS, RIGHT ANKLE: ICD-10-CM

## 2024-02-29 DIAGNOSIS — S76.101A UNSPECIFIED INJURY OF RIGHT QUADRICEPS MUSCLE, FASCIA AND TENDON, INITIAL ENCOUNTER: Chronic | ICD-10-CM

## 2024-02-29 DIAGNOSIS — M25.571 PAIN IN RIGHT ANKLE AND JOINTS OF RIGHT FOOT: ICD-10-CM

## 2024-02-29 PROCEDURE — 73721 MRI JNT OF LWR EXTRE W/O DYE: CPT

## 2024-02-29 PROCEDURE — 73721 MRI JNT OF LWR EXTRE W/O DYE: CPT | Mod: 26,RT,MH

## 2024-03-05 ENCOUNTER — APPOINTMENT (OUTPATIENT)
Dept: ORTHOPEDIC SURGERY | Facility: CLINIC | Age: 69
End: 2024-03-05
Payer: MEDICARE

## 2024-03-05 VITALS — WEIGHT: 185 LBS | HEIGHT: 69 IN | BODY MASS INDEX: 27.4 KG/M2

## 2024-03-05 DIAGNOSIS — S89.80XA OTHER SPECIFIED INJURIES OF UNSPECIFIED LOWER LEG, INITIAL ENCOUNTER: ICD-10-CM

## 2024-03-05 DIAGNOSIS — M77.8 OTHER ENTHESOPATHIES, NOT ELSEWHERE CLASSIFIED: ICD-10-CM

## 2024-03-05 DIAGNOSIS — M67.88 OTHER SPECIFIED DISORDERS OF SYNOVIUM AND TENDON, OTHER SITE: ICD-10-CM

## 2024-03-05 PROCEDURE — 99213 OFFICE O/P EST LOW 20 MIN: CPT

## 2024-03-11 ENCOUNTER — APPOINTMENT (OUTPATIENT)
Dept: ORTHOPEDIC SURGERY | Facility: CLINIC | Age: 69
End: 2024-03-11

## 2024-03-14 ENCOUNTER — APPOINTMENT (OUTPATIENT)
Dept: ORTHOPEDIC SURGERY | Facility: CLINIC | Age: 69
End: 2024-03-14
Payer: MEDICARE

## 2024-03-14 PROCEDURE — 99213 OFFICE O/P EST LOW 20 MIN: CPT

## 2024-03-15 ENCOUNTER — APPOINTMENT (OUTPATIENT)
Dept: ORTHOPEDIC SURGERY | Facility: CLINIC | Age: 69
End: 2024-03-15
Payer: MEDICARE

## 2024-03-15 VITALS — HEIGHT: 69 IN | BODY MASS INDEX: 27.11 KG/M2 | WEIGHT: 183 LBS

## 2024-03-15 PROCEDURE — 99203 OFFICE O/P NEW LOW 30 MIN: CPT

## 2024-03-20 NOTE — PHYSICAL EXAM
[de-identified] : Constitutional: Well-nourished, well-developed, No acute distress Respiratory:  Good respiratory effort, no SOB Lymphatic: No regional lymphadenopathy, no lymphedema Psychiatric: Pleasant and normal affect, alert and oriented x3 Musculoskeletal: normal except where as noted in regional exam  Lumbar Spine Exam  APPEARANCE: no marked deformities or malalignment, normal curvature of the lumbosacral spine. good posture POSITIVE TENDERNESS: + Right lumbar tenderness and spasm noted in erector spinae and quadratus lumborum NONTENDER: no bony midline tenderness, no marked tenderness in left lumbar musculature. ROM: full, although painful at end range of flexion and extension RESISTIVE TESTING: mildly painful 4/5 resisted flex/ext, sidebending b/l, and rotation SPECIAL TESTS: neg SLR b/l, neg DELVIS b/l, neg Trendelenburg b/l  PULSES: 2+ DP/PT pulses NEURO:  L1 - S2 intact to sensation and motor, DTRs 2+/4 patella and achilles  Right hip: APPEARANCE: no marked deformities, no swelling or malalignment POSITIVE TENDERNESS: + Piriformis at midsubstance and at its insertion on the greater trochanter NONTENDER: greater trochanter, trochanteric bursa, TFL, gluteal region, ischium/proximal hamstring region, hip flexor region, sartorius and pubic symphysis.  ROM: full & painless.  RESISTIVE TESTING: Mild pain with resisted hip ER, particularly at 90 of hip flexion, painless resisted IR/SLR/abduction/adduction.  SPECIAL TESTS: DELVIS reproduces mild pain and tightness in the buttock region, neg FADIR, painless loaded flexion & scouring

## 2024-03-20 NOTE — HISTORY OF PRESENT ILLNESS
[de-identified] : Patient has been having chronic low back, buttock, hip and leg pain.  He was seen by one of my hip associates, there was an MRI of the hip which was ordered and reviewed, there were no significant abnormal findings on that study.  Patient has some mild osteoarthritis, degenerative labral pathology, and some gluteal tendinopathy, he was advised he had no surgical indications regarding his hip.  He was referred today for further evaluation and management.  Patient continues to have chronic pain, denies any numbness/tingling.

## 2024-03-20 NOTE — DISCUSSION/SUMMARY
[de-identified] : Patient was seen today for evaluation management of chronic intermittent right-sided low back, buttock, hip and thigh pain.  He has very diffuse pain at this time, no specific clear etiology.  Patient already had MRI of the right hip which was reviewed by his hip specialist, he was advised he had no surgical indications.  Patient has some very mild gluteal tendinopathy, no significant tears seen.  Patient has mild osteoarthritis of the hip with associated degenerative labral pathology.  He is not having much pain in the groin area, it is unlikely that his hip is the primary etiology of his pain.  Based on history and clinical exam patient has no clinical indications for PRP consideration in any areas of his hip.  Patient is advised that the likely primary etiology of his pain is coming from his low back and/or possibly secondary pain from piriformis syndrome/sciatica.  Patient will be started on a course of physical therapy to restore normal range of motion and strength as tolerated.  Also recommend consultation with physiatry for further evaluation and management.  Patient appreciates and agrees with current plan.  This note was generated using dragon medical dictation software.  A reasonable effort has been made for proofreading its contents, but typos may still remain.  If there are any questions or points of clarification needed please notify my office.

## 2024-03-21 ENCOUNTER — APPOINTMENT (OUTPATIENT)
Dept: ORTHOPEDIC SURGERY | Facility: CLINIC | Age: 69
End: 2024-03-21

## 2024-03-21 ENCOUNTER — APPOINTMENT (OUTPATIENT)
Dept: PHYSICAL MEDICINE AND REHAB | Facility: CLINIC | Age: 69
End: 2024-03-21
Payer: MEDICARE

## 2024-03-21 DIAGNOSIS — M47.16 OTHER SPONDYLOSIS WITH MYELOPATHY, LUMBAR REGION: ICD-10-CM

## 2024-03-21 PROCEDURE — 99204 OFFICE O/P NEW MOD 45 MIN: CPT

## 2024-03-21 RX ORDER — METHYLPREDNISOLONE 4 MG/1
4 TABLET ORAL
Qty: 1 | Refills: 0 | Status: ACTIVE | COMMUNITY
Start: 2024-03-21 | End: 1900-01-01

## 2024-03-21 RX ORDER — TIZANIDINE 2 MG/1
2 TABLET ORAL
Qty: 45 | Refills: 0 | Status: ACTIVE | COMMUNITY
Start: 2024-03-21 | End: 1900-01-01

## 2024-03-21 NOTE — PHYSICAL EXAM
[FreeTextEntry1] : General exam   Constitutional: The patient appears well-developed, well-nourished, and in no apparent distress. Patient is well-groomed.    Skin: The skin is warm and dry, with normal turgor.  Eyes: PERRL.    ENMT: Ears: Hearing is grossly within normal limits.    Neck: Supple: The neck is supple.    Respiratory: Inspection: Breathing unlabored.    Neurologic: Alert and oriented x 3.   Psychiatric: Patient is cooperative and appropriate.  Mood and affect are normal.  Patient's insight is good, and memory and judgment are intact.  LUMBAR EXAM  APPEARANCE: No visible scars No gross deformity or malalignment No erythema, swelling or ecchymosis Normal lumbar lordosis +muscle atrophy of the left lower extremity +muscle atrophy of the right lower extremity No clubbing, cyanosis, swelling or erythema on the left lower extremity No clubbing, cyanosis, swelling or erythema on the right lower extremity  TENDERNESS: No trigger points over bilateral lumbar paraspinal muscles Absent over midline spinous processes Absent over left lumbar facet joints Absent over right lumbar facet joints  Absent over left lumbar paraspinal muscles: erector spinae and quadratus lumborum +over right lumbar paraspinal muscles: erector spinae and quadratus lumborum Right gluteal tenderness Absent over left sacroiliac joint Absent over right sacroiliac joint  ROM: Normal A/PROM of the lumbar spine No pain with flexion, extension of the lumbar spine No pain with left side bending +pain with right side bending No pain with left rotation No pain with right rotation No hamstring tightness on the left +hamstring tightness on the right  SPECIAL TESTS: Normal left straight leg raising test Normal right straight leg raising test FABERE left normal FABERE right normal FADIR left normal FADIR right normal  SENSORY TESTING: Intact to light touch Left L1-S2 Intact to light touch Right L1-S2  MOTOR TESTING: Muscle tone of the left lower extremity is normal Muscle tone of the right lower extremity is normal  Left hip flexion strength is 5/5 Right hip flexion strength is 5/5  Left quadriceps strength is 5/5 Right quadriceps strength is 5/5  Left ankle dorsiflexion strength is 5/5 Right ankle dorsiflexion strength is 5/5  Left ankle plantar flexion strength is 5/5 Right ankle plantar flexion strength is 5/5  REFLEXES: Patella (L4) left 2+ Patella (L4) right 2+  GAIT: Non-antalgic gait Able to walk on toes Able to walk on heels Balance normal with ambulation

## 2024-03-21 NOTE — HISTORY OF PRESENT ILLNESS
[FreeTextEntry1] : JENNIFER CALDERON is an 69 year old M here with his brother for initial evaluation of RIGHT lower back pains. Mr. CALDERON was sent for consultation by Dr. Campos for possible injection.   From Dr. Campos's note on 3/20/24: "Patient was seen today for evaluation management of chronic intermittent right-sided low back, buttock, hip and thigh pain. He has very diffuse pain at this time, no specific clear etiology. Patient already had MRI of the right hip which was reviewed by his hip specialist, he was advised he had no surgical indications....recommend consultation with physiatry for further evaluation and management."  Pain location: right lower back Quality: sharp Radiation: denies Severity: 10/10 on NRS Onset: january 2024 Associated symptoms: denies Numbness: denies Weakness: denies Exacerbated by: touch, movement, standing Improved by: rest Bowel or bladder involvement: denies  Denies bowel/bladder dysfunction, saddle anesthesia, fevers, chills, weight loss, night pain, or night sweats at this time.  The pain interferes with function, ADLs and quality of life. Patient had tried Acetaminophen, NSAIDs, prescription pain medications, muscle relaxants without any lasting relief of pain. PT made his pain worse  Patient had imaging studies to evaluate the pain in the HIP

## 2024-03-21 NOTE — ASSESSMENT
[FreeTextEntry1] : Mr. JENNIFER CALDERON is a 69 year M with pain in the lower back on the LEFT due to lumbar spondylosis without myelopathy vs strain vs gluteal tear. There are no myelopathic signs on today's exam.  Patient reassured and educated on the diagnosis and treatment options. Risks and benefits of treatment and of delaying treatment discussed with patient. Risks discussed include but not limited to: progression of symptoms, worsening pain and functional status, etc.  This note was generated using Dragon medical dictation software. A reasonable effort had been made for proofreading its contents, but spelling mistakes or grammatical errors may still remain. If there are any questions or points of clarification needed please notify my office.  Dr. Campos's note reviewed Dr. Zamora's note reviewed  MRI right hip reviewed: joint arthrosis; LEFT femural osteochondroma Advised patient to follow up with ortho about this finding and consider future repeat imaging  XR L sine to eval for DJD XR pelvis to eval for DJD  Start Medrol 4mg dose pack #21 tablets. Patient directed to follow directions on the blister pack and to complete the entire treatment course. Advised not to take any NSAIDs during of treatment. Denies CKD, CAD, or gastritis. Recommend that if patient develops GI symptoms including abdominal pain, nausea, or vomiting to discontinue use of medication immediately.  Start Tizanidine 2mg 1-2 tablets PO qHS PRN pain, dispense #30. Rx sent. Patient denies any liver problems. Monitor LFTs. Patient warned about the sedative nature of this medication and recommended not to drive or to handle heavy machinery.  Follow up 1 week

## 2024-03-22 ENCOUNTER — RESULT REVIEW (OUTPATIENT)
Age: 69
End: 2024-03-22

## 2024-03-22 ENCOUNTER — APPOINTMENT (OUTPATIENT)
Dept: RADIOLOGY | Facility: CLINIC | Age: 69
End: 2024-03-22
Payer: MEDICARE

## 2024-03-22 ENCOUNTER — OUTPATIENT (OUTPATIENT)
Dept: OUTPATIENT SERVICES | Facility: HOSPITAL | Age: 69
LOS: 1 days | End: 2024-03-22
Payer: MEDICARE

## 2024-03-22 DIAGNOSIS — S76.101A UNSPECIFIED INJURY OF RIGHT QUADRICEPS MUSCLE, FASCIA AND TENDON, INITIAL ENCOUNTER: Chronic | ICD-10-CM

## 2024-03-22 DIAGNOSIS — Z98.890 OTHER SPECIFIED POSTPROCEDURAL STATES: Chronic | ICD-10-CM

## 2024-03-22 DIAGNOSIS — M47.817 SPONDYLOSIS WITHOUT MYELOPATHY OR RADICULOPATHY, LUMBOSACRAL REGION: ICD-10-CM

## 2024-03-22 PROCEDURE — 72190 X-RAY EXAM OF PELVIS: CPT | Mod: 26

## 2024-03-22 PROCEDURE — 72190 X-RAY EXAM OF PELVIS: CPT

## 2024-03-22 PROCEDURE — 72100 X-RAY EXAM L-S SPINE 2/3 VWS: CPT | Mod: 26

## 2024-03-22 PROCEDURE — 72100 X-RAY EXAM L-S SPINE 2/3 VWS: CPT

## 2024-03-26 ENCOUNTER — APPOINTMENT (OUTPATIENT)
Dept: ORTHOPEDIC SURGERY | Facility: CLINIC | Age: 69
End: 2024-03-26

## 2024-03-28 ENCOUNTER — APPOINTMENT (OUTPATIENT)
Dept: PHYSICAL MEDICINE AND REHAB | Facility: CLINIC | Age: 69
End: 2024-03-28
Payer: MEDICARE

## 2024-03-28 DIAGNOSIS — M47.817 SPONDYLOSIS W/OUT MYELOPATHY OR RADICULOPATHY, LUMBOSACRAL REGION: ICD-10-CM

## 2024-03-28 PROCEDURE — 99213 OFFICE O/P EST LOW 20 MIN: CPT

## 2024-03-28 NOTE — HISTORY OF PRESENT ILLNESS
[FreeTextEntry1] : JENNIFER CALDERON is here for follow up with his brother. Since last visit he had XR L spine and pelvis. He completed medrol dosepack with temporary relief. Pain is still over 5/10 on NRS and worse with laying down and sitting.   Denies any new pain, numbness or weakness, bowel/bladder dysfunction, saddle anesthesia, fevers, chills, weight loss, night pain, or night sweats at this time.

## 2024-03-28 NOTE — ASSESSMENT
[FreeTextEntry1] : Mr. JENNIFER CALDERON is a 69 year M with pain in the lower back on the RIGHT due to lumbar spondylosis without myelopathy vs strain vs gluteal tear. There are no myelopathic signs on today's exam.  Patient reassured and educated on the diagnosis and treatment options. Risks and benefits of treatment and of delaying treatment discussed with patient. Risks discussed include but not limited to: progression of symptoms, worsening pain and functional status, etc.  This note was generated using Dragon medical dictation software. A reasonable effort had been made for proofreading its contents, but spelling mistakes or grammatical errors may still remain. If there are any questions or points of clarification needed please notify my office.  Dr. Campos's note reviewed Dr. Zamora's note reviewed  MRI right hip reviewed: joint arthrosis; LEFT femural osteochondroma Advised patient to follow up with ortho about this finding and consider future repeat imaging  XR L sine imaging reviewed with patient in office today. Imaging and report demonstrate: DJD XR pelvis imaging reviewed with patient in office today. Imaging and report demonstrate: DJD  Schedule for LEFT PSIS tendon region ULT guided injection in 1 week. Risks and benefits of having injection discussed with patient. Risks discussed included, but not limited to: pain, infection, bleeding requiring emergency surgery, headaches, damage to vital organs, etc.  If no relief will order MRI L spine and pelvis with SEDATION

## 2024-03-28 NOTE — PHYSICAL EXAM
[FreeTextEntry1] : General exam   Constitutional: The patient appears well-developed, well-nourished, and in no apparent distress. Patient is well-groomed.    Skin: The skin is warm and dry, with normal turgor.  Eyes: PERRL.    ENMT: Ears: Hearing is grossly within normal limits.    Neck: Supple: The neck is supple.    Respiratory: Inspection: Breathing unlabored.    Neurologic: Alert and oriented x 3.   Psychiatric: Patient is cooperative and appropriate.  Mood and affect are normal.  Patient's insight is good, and memory and judgment are intact.  Lumbar RIGHT PSIS region tenderness and trigger points Skin c/d/i without any erythema, swelling, effusion

## 2024-04-02 ENCOUNTER — APPOINTMENT (OUTPATIENT)
Dept: ORTHOPEDIC SURGERY | Facility: CLINIC | Age: 69
End: 2024-04-02
Payer: MEDICARE

## 2024-04-02 VITALS — BODY MASS INDEX: 27.11 KG/M2 | HEIGHT: 69 IN | WEIGHT: 183 LBS

## 2024-04-02 DIAGNOSIS — M24.171 OTHER ARTICULAR CARTILAGE DISORDERS, RIGHT ANKLE: ICD-10-CM

## 2024-04-02 DIAGNOSIS — M21.41 FLAT FOOT [PES PLANUS] (ACQUIRED), RIGHT FOOT: ICD-10-CM

## 2024-04-02 PROCEDURE — 99213 OFFICE O/P EST LOW 20 MIN: CPT

## 2024-04-09 ENCOUNTER — APPOINTMENT (OUTPATIENT)
Dept: PHYSICAL MEDICINE AND REHAB | Facility: CLINIC | Age: 69
End: 2024-04-09
Payer: MEDICARE

## 2024-04-09 PROCEDURE — 76942 ECHO GUIDE FOR BIOPSY: CPT

## 2024-04-09 PROCEDURE — 20550 NJX 1 TENDON SHEATH/LIGAMENT: CPT | Mod: RT

## 2024-04-09 NOTE — PROCEDURE
[de-identified] : PROCEDURE: RIGHT Posterior Superior Iliac Spine Tendon/Bursa  Injection with Ultrasound Guidance   Diagnosis:  Greater trochanteric pain syndrome   Injectate: A total of 5 mL consisting of 1 ml Methylprednisolone (80mg/mL) and 4 mL 0.25% Bupivacaine   Approach: in-plane dorsal to ventral   Complications: None   EBL: None   Findings: The RIGT PSIS was examined under ultrasound using a linear array transducer.  There was heterogeneity of the gluteus medius and gluteus minimus consistent with effusion.   Procedure:  Risks, benefits and alternatives were discussed, all questions were answered, and the patient signed informed consent. Risks include but are not limited to bleeding, infection, worsening pain, nerve damage, scar formation.   The skin was then cleaned with chloraprep. A small skin wheal was made with a 25 G 1.5 inch needle with 5ml 1% PF Lidocaine without Epinephrine.  A 25G 3.5 inch spinal needle was then inserted in-plane.  The needle was well visualized and guided to the bursal soft tissue planes. After negative aspiration, the injectate was administered within planes and not into the tendons. The patient tolerated the procedure well without complication.   Patient had improved ROM and pain relief after injection without any weakness or numbness.   If there are any complications, the patient was instructed to call us.  The patient is to follow-up with us in one to two weeks.

## 2024-04-09 NOTE — HISTORY OF PRESENT ILLNESS
[FreeTextEntry1] : JENNIFER CALDERON is here for follow up. Since last visit   Denies any new pain, numbness or weakness, bowel/bladder dysfunction, saddle anesthesia, fevers, chills, weight loss, night pain, or night sweats at this time.

## 2024-04-09 NOTE — PROCEDURE
[de-identified] : PROCEDURE: RIGHT Posterior Superior Iliac Spine Tendon/Bursa  Injection with Ultrasound Guidance   Diagnosis:  Greater trochanteric pain syndrome   Injectate: A total of 5 mL consisting of 1 ml Methylprednisolone (80mg/mL) and 4 mL 0.25% Bupivacaine   Approach: in-plane dorsal to ventral   Complications: None   EBL: None   Findings: The RIGT PSIS was examined under ultrasound using a linear array transducer.  There was heterogeneity of the gluteus medius and gluteus minimus consistent with effusion.   Procedure:  Risks, benefits and alternatives were discussed, all questions were answered, and the patient signed informed consent. Risks include but are not limited to bleeding, infection, worsening pain, nerve damage, scar formation.   The skin was then cleaned with chloraprep. A small skin wheal was made with a 25 G 1.5 inch needle with 5ml 1% PF Lidocaine without Epinephrine.  A 25G 3.5 inch spinal needle was then inserted in-plane.  The needle was well visualized and guided to the bursal soft tissue planes. After negative aspiration, the injectate was administered within planes and not into the tendons. The patient tolerated the procedure well without complication.   Patient had improved ROM and pain relief after injection without any weakness or numbness.   If there are any complications, the patient was instructed to call us.  The patient is to follow-up with us in one to two weeks.

## 2024-05-07 ENCOUNTER — APPOINTMENT (OUTPATIENT)
Dept: PHYSICAL MEDICINE AND REHAB | Facility: CLINIC | Age: 69
End: 2024-05-07
Payer: MEDICARE

## 2024-05-07 DIAGNOSIS — M54.50 LOW BACK PAIN, UNSPECIFIED: ICD-10-CM

## 2024-05-07 DIAGNOSIS — M70.61 TROCHANTERIC BURSITIS, RIGHT HIP: ICD-10-CM

## 2024-05-07 DIAGNOSIS — G89.29 LOW BACK PAIN, UNSPECIFIED: ICD-10-CM

## 2024-05-07 DIAGNOSIS — M25.551 PAIN IN RIGHT HIP: ICD-10-CM

## 2024-05-07 PROCEDURE — 99213 OFFICE O/P EST LOW 20 MIN: CPT

## 2024-05-07 NOTE — PHYSICAL EXAM
[FreeTextEntry1] : General exam   Constitutional: The patient appears well-developed, well-nourished, and in no apparent distress. Patient is well-groomed.    Skin: The skin is warm and dry, with normal turgor.  Eyes: PERRL.    ENMT: Ears: Hearing is grossly within normal limits.    Neck: Supple: The neck is supple.    Respiratory: Inspection: Breathing unlabored.    Neurologic: Alert and oriented x 3.   Psychiatric: Patient is cooperative and appropriate.  Mood and affect are normal.  Patient's insight is good, and memory and judgment are intact.  Right lumbar/gluteal Skin c/d/i without any erythema, swelling, effusion or tenderness

## 2024-05-07 NOTE — ASSESSMENT
[FreeTextEntry1] : Mr. JENNIFER CALDERON is a 69 year M with pain in the lower back on the RIGHT due to lumbar spondylosis without myelopathy vs strain vs gluteal tear. There are no myelopathic signs on today's exam.  Patient reassured and educated on the diagnosis and treatment options. Risks and benefits of treatment and of delaying treatment discussed with patient. Risks discussed include but not limited to: progression of symptoms, worsening pain and functional status, etc.  This note was generated using Dragon medical dictation software. A reasonable effort had been made for proofreading its contents, but spelling mistakes or grammatical errors may still remain. If there are any questions or points of clarification needed please notify my office.  MRI right hip reviewed: joint arthrosis; LEFT femural osteochondroma Previously advised patient to follow up with ortho about this finding and consider future repeat imaging Now s/p LEFT PSIS tendon region ULT guided injection with 50% relief Advising to restart gentle walking and elliptical use Follow up 4 weeks If no relief will order MRI L spine and pelvis with SEDATION and repeat injection

## 2024-05-07 NOTE — HISTORY OF PRESENT ILLNESS
[FreeTextEntry1] : JENNIFER CALDERON is here for follow up. Since last visit he had Right sided lumbar paraspinal/glute muscle trigger point injection. He reports 50% improvement in pain. He is still having concerns about restarting exercises.   Denies any new pain, numbness or weakness, bowel/bladder dysfunction, saddle anesthesia, fevers, chills, weight loss, night pain, or night sweats at this time.

## 2024-06-04 ENCOUNTER — APPOINTMENT (OUTPATIENT)
Dept: PHYSICAL MEDICINE AND REHAB | Facility: CLINIC | Age: 69
End: 2024-06-04
Payer: MEDICARE

## 2024-06-04 DIAGNOSIS — M67.951 UNSPECIFIED DISORDER OF SYNOVIUM AND TENDON, RIGHT THIGH: ICD-10-CM

## 2024-06-04 PROCEDURE — 99213 OFFICE O/P EST LOW 20 MIN: CPT

## 2024-06-04 NOTE — HISTORY OF PRESENT ILLNESS
[FreeTextEntry1] : JENNIFER CALDERON is here for follow up here with his friend. Since last visit patient reports that he is feeling significantly better. Rates pain a 1-2 out of 10. Only mentions that he has a stiffness in his lower right back, but that it is not interfering with ADLs. Patient reports doing the treadmill and cycling for exercise which has helped with pain.    Denies any new pain, numbness or weakness, bowel/bladder dysfunction, saddle anesthesia, fevers, chills, weight loss, night pain, or night sweats at this time.

## 2024-06-04 NOTE — PHYSICAL EXAM
[FreeTextEntry1] : General exam   Constitutional: The patient appears well-developed, well-nourished, and in no apparent distress. Patient is well-groomed.    Skin: The skin is warm and dry, with normal turgor.  Eyes: PERRL.    ENMT: Ears: Hearing is grossly within normal limits.    Neck: Supple: The neck is supple.    Respiratory: Inspection: Breathing unlabored.    Neurologic: Alert and oriented x 3.   Psychiatric: Patient is cooperative and appropriate.  Mood and affect are normal.  Patient's insight is good, and memory and judgment are intact.  Lumbar Skin c/d/i without any erythema, swelling, effusion or tenderness No tenderness on palpitation of lumbar spinus processes or paraspinal region.  Point tenderness in the right lower back near PSIS.

## 2024-06-04 NOTE — ASSESSMENT
[FreeTextEntry1] : Mr. JENNIFER CALDERON is a 69 year M with pain in the lower back on the RIGHT due to lumbar spondylosis without myelopathy vs strain vs gluteal tear. There are no myelopathic signs on today's exam.  Patient reassured and educated on the diagnosis and treatment options. Risks and benefits of treatment and of delaying treatment discussed with patient. Risks discussed include but not limited to: progression of symptoms, worsening pain and functional status, etc.  This note was generated using Dragon medical dictation software. A reasonable effort had been made for proofreading its contents, but spelling mistakes or grammatical errors may still remain. If there are any questions or points of clarification needed please notify my office.  MRI right hip: joint arthrosis; LEFT femural osteochondroma Previously advised patient to follow up with ortho about this finding and consider future repeat imaging Now s/p LEFT PSIS tendon region ULT guided injection with near total relief Continue with elliptical and cycling exercise as tolerated Follow up 6 months If pain worsens, can consider repeat trigger point injection If no relief will order MRI L spine and pelvis with SEDATION and repeat injection.

## 2024-07-09 ENCOUNTER — APPOINTMENT (OUTPATIENT)
Dept: PHYSICAL MEDICINE AND REHAB | Facility: CLINIC | Age: 69
End: 2024-07-09
Payer: MEDICARE

## 2024-07-09 PROCEDURE — 99213 OFFICE O/P EST LOW 20 MIN: CPT

## 2024-07-19 DIAGNOSIS — M62.58 MUSCLE WASTING AND ATROPHY, NOT ELSEWHERE CLASSIFIED, OTHER SITE: ICD-10-CM

## 2024-07-19 DIAGNOSIS — M67.951 UNSPECIFIED DISORDER OF SYNOVIUM AND TENDON, RIGHT THIGH: ICD-10-CM

## 2024-08-13 ENCOUNTER — APPOINTMENT (OUTPATIENT)
Dept: PHYSICAL MEDICINE AND REHAB | Facility: CLINIC | Age: 69
End: 2024-08-13
Payer: MEDICARE

## 2024-08-13 DIAGNOSIS — M67.951 UNSPECIFIED DISORDER OF SYNOVIUM AND TENDON, RIGHT THIGH: ICD-10-CM

## 2024-08-13 DIAGNOSIS — M62.58 MUSCLE WASTING AND ATROPHY, NOT ELSEWHERE CLASSIFIED, OTHER SITE: ICD-10-CM

## 2024-08-13 PROCEDURE — 99213 OFFICE O/P EST LOW 20 MIN: CPT

## 2024-08-13 NOTE — HISTORY OF PRESENT ILLNESS
[FreeTextEntry1] : JENNIFER CALDERON is here for follow up with his friend. He continues to report RIGHT sided upper buttock pain. He had MRI of the Pelvis recently and presents today to review it. Pain can be up to 10/10 on NRS. However, he has been able to use the elliptical machine.   Denies any new pain, numbness or weakness, bowel/bladder dysfunction, saddle anesthesia, fevers, chills, weight loss, night pain, or night sweats at this time.

## 2024-08-13 NOTE — ASSESSMENT
[FreeTextEntry1] : Mr. JENNIFER CALDERON is a 69 year M with pain in upper buttock on the RIGHT with gluteal tendinopathy and hamstring tears.  Patient reassured and educated on the diagnosis and treatment options. Risks and benefits of treatment and of delaying treatment discussed with patient. Risks discussed include but not limited to: progression of symptoms, worsening pain and functional status, etc.  This note was generated using Dragon medical dictation software. A reasonable effort had been made for proofreading its contents, but spelling mistakes or grammatical errors may still remain. If there are any questions or points of clarification needed please notify my office.  MRI w/o contrast of PELVIS reviewed: bilateral hamstring tears and gluteal tendinopathy Referring back to sport medicine for consideration of gluteal muscle injection vs specific exercise regiment Pain does not have any radiating or lower back component at this time, however if pain persists will order MRI L spine in the future Follow up PRN

## 2024-08-15 ENCOUNTER — APPOINTMENT (OUTPATIENT)
Dept: ORTHOPEDIC SURGERY | Facility: CLINIC | Age: 69
End: 2024-08-15
Payer: MEDICARE

## 2024-08-15 VITALS
DIASTOLIC BLOOD PRESSURE: 79 MMHG | WEIGHT: 185 LBS | HEART RATE: 76 BPM | SYSTOLIC BLOOD PRESSURE: 143 MMHG | HEIGHT: 69 IN | BODY MASS INDEX: 27.4 KG/M2

## 2024-08-15 DIAGNOSIS — G89.29 MYALGIA, OTHER SITE: ICD-10-CM

## 2024-08-15 DIAGNOSIS — M79.18 MYALGIA, OTHER SITE: ICD-10-CM

## 2024-08-15 PROCEDURE — 99213 OFFICE O/P EST LOW 20 MIN: CPT

## 2024-08-15 NOTE — HISTORY OF PRESENT ILLNESS
[de-identified] :  69 M, referred by Dr. Salcido, for right buttock gluteal pain since February. He states that he was doing a leg press at the gym and was using his right leg more than the left that day due to pain in his left heal and had right buttock gluteal pain since then. At the time it was very severe but did improve with time, since then he has had periods of some moderate relief as well as exacerbations with severe pain.  Patient states that pain is in lateral hip and glute and radiates to buttock. Dr. Salcido performed trigger point injection in this area but provided little relief. pain is worse when standing for prolonged periods, walking long distances, He is able to do elliptical with low resistance. He initially saw Dr. Zamora who recommended NSAIDs, PT, and performed steroid injection. patient states that PT was more painful than helpful and he stopped doing PT. He feels that PT was centered around potential hip pathology and as such was not helpful for his gluteal pain. MRI w/o contrast of the hip and also of the pelvis were ultimately performed. MRI of pelvis was significant for bilateral hamstring tears and gluteal tendinopathy He was referred by Dr. Salcido  back to sport medicine for consideration of gluteal muscle injection vs specific exercise regiment He was using volatrin cream with little relief, he also ices as needed. Not taking any oral medications at this time.

## 2024-08-15 NOTE — DISCUSSION/SUMMARY
[de-identified] : Patient was seen today for reevaluation of chronic right gluteal pain.  Patient is now having 8 months of persistent gluteal pain.  He has been seen by hip orthopedist, as well as nonsurgical spine specialist.  Patient has had extensive imaging.  Most recently had an MRI of the bony pelvis which showed some degenerative pathology at L4-L5 and L5-S1, he did have some mild right-sided disc herniation and foraminal stenosis, however his physiatrist did not feel this was the primary etiology of his pain as this has been present for many years.  Patient has diffuse tendinopathy in the gluteal region, but he also has the same type of tendinopathy in the iliopsoas region bilaterally, this is not a direct correlative finding as he has chronic tendinopathy due to prolonged history of exercise and physical activity.  Patient has partial tearing of the proximal hamstrings bilaterally, but this is not the site of any of his pain as his pain is located in the proximal gluteal region and this does not represent any referred pain from the hamstring.  Patient has degenerative findings in the hip, he has cam deformity and degenerative labral pathology, but again this would not correlate to posterior gluteal pain, this would be consistent with anterior hip/groin pain.  Patient is advised there is still no clear etiology to his chronic pain.  We discussed utilization of diagnostic/therapeutic myofascial trigger point injection, patient states he has already had this by Dr. Fontanez several months ago and it did not alleviate his pain, no specific need for repeat trigger point injection today.  Patient has tried physical therapy, but that worsened his pain.  Patient has done activity modification, oral NSAIDs, as well as topical pain patches.  At this time recommend a trial of acupuncture to see if this can help alleviate chronic myofascial pain.  If this does not, that would essentially rule out local hip and / or gluteal pathology.  I advised the patient this could be referred pain from the lumbar region, if he does not have pain relief with the above measures would recommend considering follow-up with physiatry for possible diagnostic/therapeutic lumbar facet or steroid injection as another future treatment option.  Patient has no apparent surgical indications, no need to see orthopedic surgeon at this time.  Patient was accompanied to the office today by his male friend who has been here previously.  Follow up as needed.  Patient appreciates and agrees with current plan.  This note was generated using dragon medical dictation software.  A reasonable effort has been made for proofreading its contents, but typos may still remain.  If there are any questions or points of clarification needed please notify my office.

## 2024-08-15 NOTE — DISCUSSION/SUMMARY
[de-identified] : Patient was seen today for reevaluation of chronic right gluteal pain.  Patient is now having 8 months of persistent gluteal pain.  He has been seen by hip orthopedist, as well as nonsurgical spine specialist.  Patient has had extensive imaging.  Most recently had an MRI of the bony pelvis which showed some degenerative pathology at L4-L5 and L5-S1, he did have some mild right-sided disc herniation and foraminal stenosis, however his physiatrist did not feel this was the primary etiology of his pain as this has been present for many years.  Patient has diffuse tendinopathy in the gluteal region, but he also has the same type of tendinopathy in the iliopsoas region bilaterally, this is not a direct correlative finding as he has chronic tendinopathy due to prolonged history of exercise and physical activity.  Patient has partial tearing of the proximal hamstrings bilaterally, but this is not the site of any of his pain as his pain is located in the proximal gluteal region and this does not represent any referred pain from the hamstring.  Patient has degenerative findings in the hip, he has cam deformity and degenerative labral pathology, but again this would not correlate to posterior gluteal pain, this would be consistent with anterior hip/groin pain.  Patient is advised there is still no clear etiology to his chronic pain.  We discussed utilization of diagnostic/therapeutic myofascial trigger point injection, patient states he has already had this by Dr. Fontanez several months ago and it did not alleviate his pain, no specific need for repeat trigger point injection today.  Patient has tried physical therapy, but that worsened his pain.  Patient has done activity modification, oral NSAIDs, as well as topical pain patches.  At this time recommend a trial of acupuncture to see if this can help alleviate chronic myofascial pain.  If this does not, that would essentially rule out local hip and / or gluteal pathology.  I advised the patient this could be referred pain from the lumbar region, if he does not have pain relief with the above measures would recommend considering follow-up with physiatry for possible diagnostic/therapeutic lumbar facet or steroid injection as another future treatment option.  Patient has no apparent surgical indications, no need to see orthopedic surgeon at this time.  Patient was accompanied to the office today by his male friend who has been here previously.  Follow up as needed.  Patient appreciates and agrees with current plan.  This note was generated using dragon medical dictation software.  A reasonable effort has been made for proofreading its contents, but typos may still remain.  If there are any questions or points of clarification needed please notify my office.

## 2024-08-15 NOTE — PHYSICAL EXAM
[de-identified] : Constitutional: Well-nourished, well-developed, No acute distress Respiratory:  Good respiratory effort, no SOB Lymphatic: No regional lymphadenopathy, no lymphedema Psychiatric: Pleasant and normal affect, alert and oriented x3 Musculoskeletal: normal except where as noted in regional exam  Lumbar Spine Exam  APPEARANCE: no marked deformities or malalignment, normal curvature of the lumbosacral spine. good posture POSITIVE TENDERNESS: + Right lumbar/gluteal tenderness and spasm noted in erector spinae and quadratus lumborum NONTENDER: no bony midline tenderness, no marked tenderness in left lumbar musculature. ROM: full, although painful at end range of flexion and extension RESISTIVE TESTING: mildly painful 4/5 resisted flex/ext, sidebending b/l, and rotation SPECIAL TESTS: neg SLR b/l, neg DELVIS b/l, neg Trendelenburg b/l  PULSES: 2+ DP/PT pulses NEURO:  L1 - S2 intact to sensation and motor, DTRs 2+/4 patella and achilles  Right hip:  APPEARANCE: no marked deformities, no swelling or malalignment POSITIVE TENDERNESS: + TTP of the gluteus scar/medius at their origin on the iliac crest NONTENDER: greater trochanter, TFL, ischium/proximal hamstring region, hip flexor region, sartorius and pubic symphysis.  ROM: full & painless.  RESISTIVE TESTING: painless resisted flex/ext, ER/IR/SLR/abduction/adduction.  SPECIAL TESTS: neg DELVIS/FADIR, painless loaded flexion & scouring

## 2024-08-15 NOTE — PHYSICAL EXAM
[de-identified] : Constitutional: Well-nourished, well-developed, No acute distress Respiratory:  Good respiratory effort, no SOB Lymphatic: No regional lymphadenopathy, no lymphedema Psychiatric: Pleasant and normal affect, alert and oriented x3 Musculoskeletal: normal except where as noted in regional exam  Lumbar Spine Exam  APPEARANCE: no marked deformities or malalignment, normal curvature of the lumbosacral spine. good posture POSITIVE TENDERNESS: + Right lumbar/gluteal tenderness and spasm noted in erector spinae and quadratus lumborum NONTENDER: no bony midline tenderness, no marked tenderness in left lumbar musculature. ROM: full, although painful at end range of flexion and extension RESISTIVE TESTING: mildly painful 4/5 resisted flex/ext, sidebending b/l, and rotation SPECIAL TESTS: neg SLR b/l, neg DELVIS b/l, neg Trendelenburg b/l  PULSES: 2+ DP/PT pulses NEURO:  L1 - S2 intact to sensation and motor, DTRs 2+/4 patella and achilles  Right hip:  APPEARANCE: no marked deformities, no swelling or malalignment POSITIVE TENDERNESS: + TTP of the gluteus scar/medius at their origin on the iliac crest NONTENDER: greater trochanter, TFL, ischium/proximal hamstring region, hip flexor region, sartorius and pubic symphysis.  ROM: full & painless.  RESISTIVE TESTING: painless resisted flex/ext, ER/IR/SLR/abduction/adduction.  SPECIAL TESTS: neg DELVIS/FADIR, painless loaded flexion & scouring

## 2024-08-15 NOTE — CONSULT LETTER
[Dear  ___] : Dear  [unfilled], [Referral Letter:] : I am referring [unfilled] to you for further evaluation.  My most recent evaluation follows. [Please see my note below.] : Please see my note below. [Consult Closing:] : Thank you very much for allowing me to participate in the care of this patient.  If you have any questions, please do not hesitate to contact me. [Sincerely,] : Sincerely, [FreeTextEntry2] : Star Watson [FreeTextEntry3] : Cash Campos, DO

## 2024-08-15 NOTE — HISTORY OF PRESENT ILLNESS
[de-identified] :  69 M, referred by Dr. Salcido, for right buttock gluteal pain since February. He states that he was doing a leg press at the gym and was using his right leg more than the left that day due to pain in his left heal and had right buttock gluteal pain since then. At the time it was very severe but did improve with time, since then he has had periods of some moderate relief as well as exacerbations with severe pain.  Patient states that pain is in lateral hip and glute and radiates to buttock. Dr. Salcido performed trigger point injection in this area but provided little relief. pain is worse when standing for prolonged periods, walking long distances, He is able to do elliptical with low resistance. He initially saw Dr. Zamora who recommended NSAIDs, PT, and performed steroid injection. patient states that PT was more painful than helpful and he stopped doing PT. He feels that PT was centered around potential hip pathology and as such was not helpful for his gluteal pain. MRI w/o contrast of the hip and also of the pelvis were ultimately performed. MRI of pelvis was significant for bilateral hamstring tears and gluteal tendinopathy He was referred by Dr. Salcido  back to sport medicine for consideration of gluteal muscle injection vs specific exercise regiment He was using volatrin cream with little relief, he also ices as needed. Not taking any oral medications at this time.

## 2024-12-20 ENCOUNTER — OFFICE (OUTPATIENT)
Facility: LOCATION | Age: 69
Setting detail: OPHTHALMOLOGY
End: 2024-12-20
Payer: MEDICARE

## 2024-12-20 DIAGNOSIS — D31.32: ICD-10-CM

## 2024-12-20 DIAGNOSIS — H35.373: ICD-10-CM

## 2024-12-20 DIAGNOSIS — H35.412: ICD-10-CM

## 2024-12-20 DIAGNOSIS — H25.13: ICD-10-CM

## 2024-12-20 DIAGNOSIS — H11.153: ICD-10-CM

## 2024-12-20 DIAGNOSIS — E78.01: ICD-10-CM

## 2024-12-20 DIAGNOSIS — H57.12: ICD-10-CM

## 2024-12-20 PROCEDURE — 92134 CPTRZ OPH DX IMG PST SGM RTA: CPT | Performed by: OPHTHALMOLOGY

## 2024-12-20 PROCEDURE — 92014 COMPRE OPH EXAM EST PT 1/>: CPT | Performed by: OPHTHALMOLOGY

## 2024-12-20 ASSESSMENT — REFRACTION_MANIFEST
OS_ADD: +2.50
OS_SPHERE: +1.75
OS_VA1: 20/25+2
OD_ADD: +2.50
OD_CYLINDER: -0.50
OD_VA2: 20/20
OD_AXIS: 105
OS_VA2: 20/20
OD_CYLINDER: -0.75
OD_AXIS: 100
OS_SPHERE: +2.00
OD_ADD: +2.50
OS_AXIS: 090
OS_VA1: 20/20
OD_SPHERE: +2.00
OS_CYLINDER: -0.50
OD_ADD: +2.50
OD_VA1: 20/20
OD_SPHERE: +1.75
OS_ADD: +2.50
OD_VA2: 20/20
OD_SPHERE: +2.00
OS_AXIS: 090
OS_VA1: 20/30+2
OD_VA1: 20/20-1
OS_CYLINDER: -0.75
OD_AXIS: 105
OS_AXIS: 90
OD_VA1: 20/20
OS_SPHERE: +2.25
OS_VA2: 20/20
OD_CYLINDER: -0.75
OS_CYLINDER: -0.75
OS_ADD: +2.50

## 2024-12-20 ASSESSMENT — REFRACTION_CURRENTRX
OS_ADD: +2.75
OS_CYLINDER: -0.75
OS_OVR_VA: 20/
OD_AXIS: 119
OD_ADD: +2.75
OD_CYLINDER: -0.75
OS_SPHERE: +1.75
OD_VPRISM_DIRECTION: PROGS
OS_OVR_VA: 20/
OD_AXIS: 104
OS_ADD: +2.50
OS_AXIS: 091
OS_OVR_VA: 20/
OD_CYLINDER: -0.50
OD_AXIS: 102
OD_OVR_VA: 20/
OS_ADD: +2.00
OD_ADD: +2.00
OS_VPRISM_DIRECTION: PROGS
OS_VPRISM_DIRECTION: PROGS
OD_OVR_VA: 20/
OS_AXIS: 091
OS_CYLINDER: -0.75
OS_ADD: +2.50
OS_AXIS: 092
OS_AXIS: 090
OD_SPHERE: +1.75
OD_CYLINDER: -0.75
OS_SPHERE: +1.75
OS_VPRISM_DIRECTION: PROGS
OD_ADD: +2.50
OS_VPRISM_DIRECTION: PROGS
OD_VPRISM_DIRECTION: PROGS
OS_CYLINDER: -1.00
OD_SPHERE: +1.75
OS_SPHERE: +1.75
OD_OVR_VA: 20/
OD_VPRISM_DIRECTION: PROGS
OD_CYLINDER: +0.75
OD_SPHERE: +1.75
OD_VPRISM_DIRECTION: PROGS
OS_SPHERE: +1.75
OD_SPHERE: +1.75
OD_AXIS: 097
OS_CYLINDER: -0.75
OD_ADD: +2.50

## 2024-12-20 ASSESSMENT — REFRACTION_AUTOREFRACTION
OS_AXIS: 091
OS_SPHERE: +3.25
OD_CYLINDER: -1.50
OS_CYLINDER: -1.25
OD_AXIS: 091
OD_SPHERE: +3.25

## 2024-12-20 ASSESSMENT — TONOMETRY
OD_IOP_MMHG: 14
OS_IOP_MMHG: 17

## 2024-12-20 ASSESSMENT — CONFRONTATIONAL VISUAL FIELD TEST (CVF)
OS_FINDINGS: FULL
OD_FINDINGS: FULL

## 2024-12-20 ASSESSMENT — KERATOMETRY
OD_K2POWER_DIOPTERS: 45.75
OS_K1POWER_DIOPTERS: 44.75
OD_K1POWER_DIOPTERS: 44.50
OS_K2POWER_DIOPTERS: 45.75
OS_AXISANGLE_DEGREES: 171
OD_AXISANGLE_DEGREES: 002
METHOD_AUTO_MANUAL: AUTO

## 2024-12-20 ASSESSMENT — VISUAL ACUITY
OS_BCVA: 20/20-2
OD_BCVA: 20/25+2

## 2025-06-11 ENCOUNTER — NON-APPOINTMENT (OUTPATIENT)
Age: 70
End: 2025-06-11

## 2025-06-14 ENCOUNTER — NON-APPOINTMENT (OUTPATIENT)
Age: 70
End: 2025-06-14

## 2025-06-16 ENCOUNTER — APPOINTMENT (OUTPATIENT)
Dept: ORTHOPEDIC SURGERY | Facility: CLINIC | Age: 70
End: 2025-06-16

## 2025-06-16 VITALS — HEIGHT: 69 IN | WEIGHT: 183 LBS | BODY MASS INDEX: 27.11 KG/M2

## 2025-06-16 PROCEDURE — 73080 X-RAY EXAM OF ELBOW: CPT | Mod: LT

## 2025-06-16 PROCEDURE — 99213 OFFICE O/P EST LOW 20 MIN: CPT

## 2025-06-20 ENCOUNTER — APPOINTMENT (OUTPATIENT)
Age: 70
End: 2025-06-20

## 2025-06-20 ENCOUNTER — APPOINTMENT (OUTPATIENT)
Dept: UROLOGY | Facility: CLINIC | Age: 70
End: 2025-06-20
Payer: MEDICARE

## 2025-06-20 VITALS — SYSTOLIC BLOOD PRESSURE: 111 MMHG | HEART RATE: 71 BPM | DIASTOLIC BLOOD PRESSURE: 69 MMHG

## 2025-06-20 PROCEDURE — 99204 OFFICE O/P NEW MOD 45 MIN: CPT

## 2025-06-20 PROCEDURE — G2211 COMPLEX E/M VISIT ADD ON: CPT

## 2025-06-20 PROCEDURE — 51798 US URINE CAPACITY MEASURE: CPT

## 2025-06-20 RX ORDER — TAMSULOSIN HYDROCHLORIDE 0.4 MG/1
0.4 CAPSULE ORAL
Qty: 90 | Refills: 0 | Status: ACTIVE | COMMUNITY
Start: 2025-06-20 | End: 1900-01-01

## 2025-06-25 ENCOUNTER — NON-APPOINTMENT (OUTPATIENT)
Age: 70
End: 2025-06-25

## 2025-06-25 PROBLEM — S59.902D ELBOW INJURY, LEFT, SUBSEQUENT ENCOUNTER: Status: ACTIVE | Noted: 2025-06-25

## 2025-06-26 ENCOUNTER — APPOINTMENT (OUTPATIENT)
Dept: ORTHOPEDIC SURGERY | Facility: CLINIC | Age: 70
End: 2025-06-26
Payer: MEDICARE

## 2025-06-26 VITALS — WEIGHT: 180 LBS | HEIGHT: 69 IN | BODY MASS INDEX: 26.66 KG/M2

## 2025-06-26 PROCEDURE — 99214 OFFICE O/P EST MOD 30 MIN: CPT

## 2025-06-30 PROBLEM — S46.212D RUPTURE OF DISTAL BICEPS TENDON, LEFT, SUBSEQUENT ENCOUNTER: Status: ACTIVE | Noted: 2025-06-30

## 2025-08-15 ENCOUNTER — APPOINTMENT (OUTPATIENT)
Dept: UROLOGY | Facility: CLINIC | Age: 70
End: 2025-08-15
Payer: MEDICARE

## 2025-08-15 VITALS
SYSTOLIC BLOOD PRESSURE: 141 MMHG | HEIGHT: 69 IN | WEIGHT: 183 LBS | TEMPERATURE: 98.9 F | HEART RATE: 68 BPM | BODY MASS INDEX: 27.11 KG/M2 | RESPIRATION RATE: 17 BRPM | DIASTOLIC BLOOD PRESSURE: 73 MMHG

## 2025-08-15 DIAGNOSIS — N13.8 BENIGN PROSTATIC HYPERPLASIA WITH LOWER URINARY TRACT SYMPMS: ICD-10-CM

## 2025-08-15 DIAGNOSIS — N40.1 BENIGN PROSTATIC HYPERPLASIA WITH LOWER URINARY TRACT SYMPMS: ICD-10-CM

## 2025-08-15 PROCEDURE — 99212 OFFICE O/P EST SF 10 MIN: CPT

## 2025-09-15 ENCOUNTER — RX RENEWAL (OUTPATIENT)
Age: 70
End: 2025-09-15